# Patient Record
Sex: FEMALE | Race: OTHER | HISPANIC OR LATINO
[De-identification: names, ages, dates, MRNs, and addresses within clinical notes are randomized per-mention and may not be internally consistent; named-entity substitution may affect disease eponyms.]

---

## 2021-03-09 PROBLEM — Z00.00 ENCOUNTER FOR PREVENTIVE HEALTH EXAMINATION: Status: ACTIVE | Noted: 2021-03-09

## 2021-03-12 ENCOUNTER — APPOINTMENT (OUTPATIENT)
Dept: PODIATRY | Facility: CLINIC | Age: 56
End: 2021-03-12
Payer: COMMERCIAL

## 2021-03-12 VITALS — BODY MASS INDEX: 32.28 KG/M2 | WEIGHT: 171 LBS | HEIGHT: 61 IN

## 2021-03-12 DIAGNOSIS — E78.00 PURE HYPERCHOLESTEROLEMIA, UNSPECIFIED: ICD-10-CM

## 2021-03-12 DIAGNOSIS — I10 ESSENTIAL (PRIMARY) HYPERTENSION: ICD-10-CM

## 2021-03-12 DIAGNOSIS — Z78.9 OTHER SPECIFIED HEALTH STATUS: ICD-10-CM

## 2021-03-12 DIAGNOSIS — L60.2 ONYCHOGRYPHOSIS: ICD-10-CM

## 2021-03-12 DIAGNOSIS — Z83.3 FAMILY HISTORY OF DIABETES MELLITUS: ICD-10-CM

## 2021-03-12 DIAGNOSIS — L60.3 NAIL DYSTROPHY: ICD-10-CM

## 2021-03-12 PROCEDURE — 11721 DEBRIDE NAIL 6 OR MORE: CPT

## 2021-03-12 PROCEDURE — 99072 ADDL SUPL MATRL&STAF TM PHE: CPT

## 2021-03-12 PROCEDURE — 99203 OFFICE O/P NEW LOW 30 MIN: CPT | Mod: 25

## 2021-03-12 RX ORDER — ATORVASTATIN CALCIUM 10 MG/1
10 TABLET, FILM COATED ORAL
Refills: 0 | Status: ACTIVE | COMMUNITY

## 2021-03-12 RX ORDER — LISINOPRIL 5 MG/1
5 TABLET ORAL
Refills: 0 | Status: ACTIVE | COMMUNITY

## 2021-03-12 NOTE — PHYSICAL EXAM
[General Appearance - Alert] : alert [General Appearance - In No Acute Distress] : in no acute distress [No Joint Swelling] : no joint swelling [Pes Planus] : pes planus deformity [Normal Foot/Ankle] : Both lower extremities were exposed and visualized. Standing exam demonstrates normal foot posture and alignment. Hindfoot exam shows no hindfoot valgus or varus [Skin Color & Pigmentation] : normal skin color and pigmentation [Skin Turgor] : normal skin turgor [] : no rash [Skin Lesions] : no skin lesions [Sensation] : the sensory exam was normal to light touch and pinprick [No Focal Deficits] : no focal deficits [Deep Tendon Reflexes (DTR)] : deep tendon reflexes were 2+ and symmetric [Motor Exam] : the motor exam was normal [Oriented To Time, Place, And Person] : oriented to person, place, and time [Impaired Insight] : insight and judgment were intact [Affect] : the affect was normal [FreeTextEntry3] : Vascular exam reveals palpable pedal pulses, the foot is warm to touch, there was good capillary fill time, the skin is normal in appearance there is no evidence of vascular disease or compromise at this time [Foot Ulcer] : no foot ulcer [Skin Induration] : no skin induration [Diminished Throughout Right Foot] : normal sensation with monofilament testing throughout right foot [Diminished Throughout Left Foot] : normal sensation with monofilament testing throughout left foot

## 2021-03-12 NOTE — HISTORY OF PRESENT ILLNESS
[FreeTextEntry1] : Long-standing history of diabetes and hypoglycemics as well as insulin. Patient does admit to not being well-controlled and his recent hemoglobin A1c is 11.3 and he has been followed by Dr. davis and she tells me she has recently been referred to [facility]sl[facility] and endocrinology for diabetes and glycemic management

## 2021-03-12 NOTE — PROCEDURE
Ken Becker is aware of the expectations of the injection.  The risks and complications of the injection have been explained as well as the alternatives.  The complications were discussed. There were no assurances or guarantees given to Ken Becker as to the result of the injection.    The left knee was prepped with Chlorhexidine and alcohol, and the joint space was injected with 2 cc of Euflexxa.  The patient tolerated the injection well.       [FreeTextEntry1] : A lengthy discussion with the patient regarding diabetes and the manifestations second occur in the feet. The discussion included but was not limited to nail care, treatment of open wound, treatment of calluses, shoe gear, as well as statistics regarding diabetes as it relates to loss of toe, toes, midfoot, as well as mortality Re: diabetics wind up with a midfoot amputation, transmetatarsal amputation, as well as 50 percent of diabetics who suffer loss of the leg suffering mortality within 5 years. Educational literature was dispensed. Overall diabetic education as it regard to the foot was discussed with all questions asked and answered appropriately. I've advised the patient should there be any concerns regarding nails, infection, open wounds, interdigital problems, or acute injury that they should contact the office immediately\par I have had a lengthy discussion with the patient regarding overall skincare. The importance of the type of socks, the type of shoes, and the type of overall foot hygiene is important to help control and prevent eruptions especially over extreme weather changes. This included but was not limited to hydration and lubrication, dove soap, triple rinse clothing and linens. I also explained the importance of thorough drying of both feet especially the web spaces. Given the extreme temperatures back in a car I also reviewed the type of shoes that would help reduce the chances of cracking of the skin especially leading to fissuring of the heels. Overall skincare precautions were reviewed education literature dispensed in the patient's questions asked and answered appropriately.\par A complete and thorough evaluation of the type of shoes they should be wearing and type of shoes for this time of year was discussed with patient.\par Using sterile instrumentation debridement of all nails manually and electrically to decrease thickness, pain and girth and make shoe gear more comfortable with "slant back" procedure of any bordering spicules causing pain\par \par \par greater than 30 minutes spent with patient\par \par

## 2021-04-30 ENCOUNTER — APPOINTMENT (OUTPATIENT)
Dept: ENDOCRINOLOGY | Facility: CLINIC | Age: 56
End: 2021-04-30
Payer: COMMERCIAL

## 2021-04-30 ENCOUNTER — NON-APPOINTMENT (OUTPATIENT)
Age: 56
End: 2021-04-30

## 2021-04-30 VITALS
DIASTOLIC BLOOD PRESSURE: 70 MMHG | HEIGHT: 61 IN | OXYGEN SATURATION: 98 % | SYSTOLIC BLOOD PRESSURE: 120 MMHG | WEIGHT: 170 LBS | HEART RATE: 77 BPM | BODY MASS INDEX: 32.1 KG/M2

## 2021-04-30 PROCEDURE — 99203 OFFICE O/P NEW LOW 30 MIN: CPT

## 2021-04-30 PROCEDURE — 99072 ADDL SUPL MATRL&STAF TM PHE: CPT

## 2021-05-07 NOTE — HISTORY OF PRESENT ILLNESS
[FreeTextEntry1] : Apr 30, 2021       in person  with her daughter Miracle [phone]\par \par PCP:  Dr Hermes Barnes\par          Pod:   Dr. José Miguel Ramos\par           Eyes:   Dr. 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, works as house keeper.\par \par : :\par Constitutional:  Alert, well nourished, healthy appearance, no acute distress \par Eyes:  No proptosis, no stare\par Thyroid:\par Pulmonary:  No respiratory distress, no accessory muscle use; normal rate and effort\par Cardiac:  Normal rate\par Vascular: \par Endocrine:  No stigmata of Cushing’s Syndrome\par Musculoskeletal:  Normal gait, no involuntary movements\par Neurology:  No tremors\par Affect/Mood/Psych:  Oriented x 3; normal affect, normal insight/judgement, normal mood \par .\par \par \par .\par Does self monitoring, but did not bring meter today.\par On Lantus HS - 40\par metformin 1000 "prn"  \par \par Diet:\par \par Breakfast-coffee,  sweat potato, oatmeal, occas egg.\par Lunch-rice, meat\par Dinner - vegetables, milk, \par \par Impression:   Fingerstick BS data will be most helpful in providing guidance on diabetes management.\par She will bring records, fingerstick sugars, food diary, to next visit

## 2021-08-03 ENCOUNTER — APPOINTMENT (OUTPATIENT)
Dept: ENDOCRINOLOGY | Facility: CLINIC | Age: 56
End: 2021-08-03
Payer: COMMERCIAL

## 2021-08-03 VITALS
DIASTOLIC BLOOD PRESSURE: 60 MMHG | OXYGEN SATURATION: 99 % | HEART RATE: 82 BPM | SYSTOLIC BLOOD PRESSURE: 100 MMHG | WEIGHT: 175 LBS | BODY MASS INDEX: 33.04 KG/M2 | HEIGHT: 61 IN

## 2021-08-03 PROCEDURE — 99214 OFFICE O/P EST MOD 30 MIN: CPT

## 2021-08-03 NOTE — HISTORY OF PRESENT ILLNESS
[FreeTextEntry1] : Aug 03, 2021   in person with Miracle\par \par PCP:  Dr Hermes Barnes\par          Pod:   Dr. José Miguel Ramos\par           Eyes:   Dr. Sanchez  in [place] \par \par CC:   Type 1 diabetes (age 31)     3/8/21  A1c 11.3, glucose 212 mg/dl, C-peptide 1.8  \par \par She brings in the OneTouch Ultra 2 insulin  ([facility]  at [facility])\par Taking Lantus Solostar at 9 pm -  50 units.  \par \par Impression:  Metformin no longer appears to be providing benefit.\par She would benefit from addition of fast acting insulin (?Ademlog) AC meals by sliding scale)\par \par \par \par \par \par \par Apr 30, 2021       in person  with her daughter Miracle  [phone]\par \par PCP:  Dr Hermes Barnes\par          Pod:   Dr. José Miguel Ramos\par           Eyes:   Dr. 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, works as house keeper.\par \par : :\par Constitutional:  Alert, well nourished, healthy appearance, no acute distress \par Eyes:  No proptosis, no stare\par Thyroid:\par Pulmonary:  No respiratory distress, no accessory muscle use; normal rate and effort\par Cardiac:  Normal rate\par Vascular: \par Endocrine:  No stigmata of Cushing’s Syndrome\par Musculoskeletal:  Normal gait, no involuntary movements\par Neurology:  No tremors\par Affect/Mood/Psych:  Oriented x 3; normal affect, normal insight/judgement, normal mood \par .\par \par \par .\par Does self monitoring, but did not bring meter today.\par On Lantus HS - 40\par metformin 1000 "prn"  \par \par Diet:\par \par Breakfast-coffee,  sweat potato, oatmeal, occas egg.\par Lunch-rice, meat\par Dinner - vegetables, milk, \par \par Impression:   Fingerstick BS data will be most helpful in providing guidance on diabetes management.\par She will bring records, fingerstick sugars, food diary, to next visit

## 2021-08-08 RX ORDER — SYRING-NEEDL,DISP,INSUL,0.3 ML 31 GX5/16"
31G X 5/16" SYRINGE, EMPTY DISPOSABLE MISCELLANEOUS
Qty: 100 | Refills: 5 | Status: ACTIVE | COMMUNITY
Start: 2021-08-08 | End: 1900-01-01

## 2021-08-08 RX ORDER — INSULIN LISPRO 100 U/ML
100 INJECTION, SOLUTION SUBCUTANEOUS
Qty: 1 | Refills: 3 | Status: DISCONTINUED | COMMUNITY
Start: 2021-08-03 | End: 2021-08-08

## 2021-09-07 ENCOUNTER — APPOINTMENT (OUTPATIENT)
Dept: ENDOCRINOLOGY | Facility: CLINIC | Age: 56
End: 2021-09-07
Payer: COMMERCIAL

## 2021-09-07 VITALS
SYSTOLIC BLOOD PRESSURE: 120 MMHG | DIASTOLIC BLOOD PRESSURE: 60 MMHG | BODY MASS INDEX: 33.23 KG/M2 | HEART RATE: 86 BPM | HEIGHT: 61 IN | OXYGEN SATURATION: 98 % | WEIGHT: 176 LBS

## 2021-09-07 PROCEDURE — 99214 OFFICE O/P EST MOD 30 MIN: CPT

## 2021-09-07 RX ORDER — PEN NEEDLE, DIABETIC 29 G X1/2"
31G X 5 MM NEEDLE, DISPOSABLE MISCELLANEOUS
Qty: 120 | Refills: 6 | Status: ACTIVE | COMMUNITY
Start: 2021-08-08 | End: 1900-01-01

## 2021-09-07 RX ORDER — INSULIN ASPART 100 [IU]/ML
100 INJECTION, SOLUTION INTRAVENOUS; SUBCUTANEOUS
Qty: 1 | Refills: 1 | Status: DISCONTINUED | COMMUNITY
Start: 2021-08-08 | End: 2021-09-07

## 2021-09-09 PROBLEM — Z00.00 ENCOUNTER FOR PREVENTIVE HEALTH EXAMINATION: Status: ACTIVE | Noted: 2021-09-09

## 2021-09-10 RX ORDER — INSULIN GLARGINE 100 [IU]/ML
INJECTION, SOLUTION SUBCUTANEOUS
Refills: 0 | Status: DISCONTINUED | COMMUNITY
End: 2021-09-10

## 2021-09-10 NOTE — HISTORY OF PRESENT ILLNESS
[FreeTextEntry1] : Sep 07, 2021    in person     w/  Shruthi   c:   = other daughter   \par \par PCP:  Dr Hermes Barnes\par          Pod:   Dr. José Miguel Meehan \par           Eyes:   Dr. Sanchez  in [place] \par \par CC:   Type 1 diabetes (age 31)     3/8/21  A1c 11.3, glucose 212 mg/dl, C-peptide 1.8  \par \par She brings in the OneTouch Ultra 2  ([facility])\par Taking Lantus Solostar at 9 pm -  50 units.  \par \par She brings her OneTouch Ultra 2 meter -  I corrected year, date, time, language (to Occitan)\par Sugars relatively elevated.  \par \par : :\par Constitutional:  Alert, well nourished, healthy appearance, no acute distress \par Eyes:  No proptosis, no stare\par Thyroid:\par Pulmonary:  No respiratory distress, no accessory muscle use; normal rate and effort\par Cardiac:  Normal rate\par Vascular: \par Endocrine:  No stigmata of Cushing’s Syndrome\par Musculoskeletal:  Normal gait, no involuntary movements\par Neurology:  No tremors\par Affect/Mood/Psych:  Oriented x 3; normal affect, normal insight/judgement, normal mood \par .\par \par Impression:  \par Diabetes under loose control.\par She has available to her a relatively high carbohydrate diet.\par She would likely benefit from a short acting insulin before meals.\par \par Plan:  Start Insulin Lispro 4 units if BS is over 140.\par Contact me in about one week to review progress.\par \par \par \par \par \par \par Aug 03, 2021   in person with Miracle\par \par PCP:  Dr Hermes Barnes\par          Pod:   Dr. José Miguel Ramos\par           Eyes:   Dr. Sanchez  in [place] \par \par CC:   Type 1 diabetes (age 31)     3/8/21  A1c 11.3, glucose 212 mg/dl, C-peptide 1.8  \par \par She brings in the OneTouch Ultra 2 insulin  ([facility])\par Taking Lantus Solostar at 9 pm -  50 units.  \par \par Impression:  Metformin no longer appears to be providing benefit.\par She would benefit from addition of fast acting insulin (?Ademlog) AC meals by sliding scale)\par \par \par \par \par \par \par Apr 30, 2021       in person  with her daughter Miracle  [phone]\par \par PCP:  Dr Hermes Barnes\par          Pod:   Dr. José Miguel Ramos\par           Eyes:   Dr. 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, works as house keeper.\par \par : :\par Constitutional:  Alert, well nourished, healthy appearance, no acute distress \par Eyes:  No proptosis, no stare\par Thyroid:\par Pulmonary:  No respiratory distress, no accessory muscle use; normal rate and effort\par Cardiac:  Normal rate\par Vascular: \par Endocrine:  No stigmata of Cushing’s Syndrome\par Musculoskeletal:  Normal gait, no involuntary movements\par Neurology:  No tremors\par Affect/Mood/Psych:  Oriented x 3; normal affect, normal insight/judgement, normal mood \par .\par \par \par .\par Does self monitoring, but did not bring meter today.\par On Lantus HS - 40\par metformin 1000 "prn"  \par \par Diet:\par \par Breakfast-coffee,  sweat potato, oatmeal, occas egg.\par Lunch-rice, meat\par Dinner - vegetables, milk, \par \par Impression:   Fingerstick BS data will be most helpful in providing guidance on diabetes management.\par She will bring records, fingerstick sugars, food diary, to next visit

## 2021-09-12 ENCOUNTER — NON-APPOINTMENT (OUTPATIENT)
Age: 56
End: 2021-09-12

## 2021-09-12 RX ORDER — SYRING-NEEDL,DISP,INSUL,0.3 ML 30 GX5/16"
30G X 5/16" SYRINGE, EMPTY DISPOSABLE MISCELLANEOUS
Qty: 100 | Refills: 6 | Status: ACTIVE | COMMUNITY
Start: 2021-09-12 | End: 1900-01-01

## 2021-09-12 RX ORDER — INSULIN ASPART 100 [IU]/ML
100 INJECTION, SOLUTION INTRAVENOUS; SUBCUTANEOUS
Qty: 1 | Refills: 1 | Status: ACTIVE | COMMUNITY
Start: 2021-09-12 | End: 1900-01-01

## 2021-09-14 ENCOUNTER — APPOINTMENT (OUTPATIENT)
Dept: OBGYN | Facility: CLINIC | Age: 56
End: 2021-09-14

## 2021-10-05 ENCOUNTER — APPOINTMENT (OUTPATIENT)
Dept: ENDOCRINOLOGY | Facility: CLINIC | Age: 56
End: 2021-10-05
Payer: COMMERCIAL

## 2021-10-05 VITALS
BODY MASS INDEX: 33.04 KG/M2 | SYSTOLIC BLOOD PRESSURE: 125 MMHG | OXYGEN SATURATION: 98 % | HEIGHT: 61 IN | DIASTOLIC BLOOD PRESSURE: 78 MMHG | HEART RATE: 75 BPM | WEIGHT: 175 LBS

## 2021-10-05 PROCEDURE — 99214 OFFICE O/P EST MOD 30 MIN: CPT

## 2021-10-05 RX ORDER — METFORMIN HYDROCHLORIDE 1000 MG/1
1000 TABLET, COATED ORAL
Qty: 180 | Refills: 3 | Status: ACTIVE | COMMUNITY

## 2021-10-05 RX ORDER — INSULIN LISPRO 100 [IU]/ML
(75-25) 100 INJECTION, SUSPENSION SUBCUTANEOUS
Qty: 2 | Refills: 5 | Status: ACTIVE | COMMUNITY
Start: 2021-10-05 | End: 1900-01-01

## 2021-10-06 RX ORDER — INSULIN LISPRO 100 U/ML
100 INJECTION, SOLUTION SUBCUTANEOUS
Qty: 2 | Refills: 0 | Status: ACTIVE | COMMUNITY
Start: 2021-09-07 | End: 1900-01-01

## 2021-10-06 NOTE — HISTORY OF PRESENT ILLNESS
[FreeTextEntry1] : Oct 05, 2021    in person    w/ Shruthi  \par \par PCP:  Dr Hermes Barnes\par          Pod:   Dr. José Miguel Meehan \par           Eyes:   Dr. Sanchez  in [place] \par \par CC:   Type 1 diabetes (age 31)     3/8/21  A1c 11.3, glucose 212 mg/dl, C-peptide 1.8  \par \par \par Brings in her blood glucose meter, data reviewed, testing sugars 4X a day \par Remains on Lantus insulin 50 units once a day in PM and Novollog by sliding scale ac meals.\par Turns out that Novolog pen is not preferred insulin.\par \par Comes in with replacement Juliano 2 and Shruthi instructed in its use.  \par Will switch from Novolog before lunch to Admelog Solostar before lunch by sliding scale:  BW under 140: 0;  BS over 140: 4 units;\par BS over 200: 5 units.  \par \par \par Sep 07, 2021    in person     w/  Shruthi   c:   = other daughter   \par \par PCP:  Dr Hermes Barnes\par          Pod:   Dr. José Miguel Meehan \par           Eyes:   Dr. Sanchez  in [place] \par \par CC:   Type 1 diabetes (age 31)     3/8/21  A1c 11.3, glucose 212 mg/dl, C-peptide 1.8  \par \par She brings in the OneTouch Ultra 2  ([facility]  at [facility])\par Taking Lantus Solostar at 9 pm -  50 units.  \par \par She brings her OneTouch Ultra 2 meter -  I corrected year, date, time, language (to [place] citizen)\par Sugars relatively elevated.  \par \par : :\par Constitutional:  Alert, well nourished, healthy appearance, no acute distress \par Eyes:  No proptosis, no stare\par Thyroid:\par Pulmonary:  No respiratory distress, no accessory muscle use; normal rate and effort\par Cardiac:  Normal rate\par Vascular: \par Endocrine:  No stigmata of Cushing’s Syndrome\par Musculoskeletal:  Normal gait, no involuntary movements\par Neurology:  No tremors\par Affect/Mood/Psych:  Oriented x 3; normal affect, normal insight/judgement, normal mood \par .\par \par Impression:  \par Diabetes under loose control.\par She has available to her a relatively high carbohydrate diet.\par She would likely benefit from a short acting insulin before meals.\par \par Plan:  Start Insulin Lispro 4 units if BS is over 140.\par Contact me in about one week to review progress.\par \par \par \par \par \par \par Aug 03, 2021   in person with Miracle\par \par PCP:  Dr Hermes Barnes\par          Pod:   Dr. José Miguel Ramos\par           Eyes:   Dr. Sanchez  in [place] \par \par CC:   Type 1 diabetes (age 31)     3/8/21  A1c 11.3, glucose 212 mg/dl, C-peptide 1.8  \par \par She brings in the OneTouch Ultra 2 insulin  ([facility]  at [facility])\par Taking Lantus Solostar at 9 pm -  50 units.  \par \par Impression:  Metformin no longer appears to be providing benefit.\par She would benefit from addition of fast acting insulin (?Ademlog) AC meals by sliding scale)\par \par \par \par \par \par \par Apr 30, 2021       in person  with her daughter Miracle  [phone]\par \par PCP:  Dr Hermes Barnes\par          Pod:   Dr. José Miguel Ramos\par           Eyes:   Dr. 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, works as house keeper.\par \par : :\par Constitutional:  Alert, well nourished, healthy appearance, no acute distress \par Eyes:  No proptosis, no stare\par Thyroid:\par Pulmonary:  No respiratory distress, no accessory muscle use; normal rate and effort\par Cardiac:  Normal rate\par Vascular: \par Endocrine:  No stigmata of Cushing’s Syndrome\par Musculoskeletal:  Normal gait, no involuntary movements\par Neurology:  No tremors\par Affect/Mood/Psych:  Oriented x 3; normal affect, normal insight/judgement, normal mood \par .\par \par \par .\par Does self monitoring, but did not bring meter today.\par On Lantus HS - 40\par metformin 1000 "prn"  \par \par Diet:\par \par Breakfast-coffee,  sweat potato, oatmeal, occas egg.\par Lunch-rice, meat\par Dinner - vegetables, milk, \par \par Impression:   Fingerstick BS data will be most helpful in providing guidance on diabetes management.\par She will bring records, fingerstick sugars, food diary, to next visit

## 2021-11-02 ENCOUNTER — APPOINTMENT (OUTPATIENT)
Dept: ENDOCRINOLOGY | Facility: CLINIC | Age: 56
End: 2021-11-02
Payer: COMMERCIAL

## 2021-11-02 VITALS
OXYGEN SATURATION: 97 % | DIASTOLIC BLOOD PRESSURE: 70 MMHG | WEIGHT: 175 LBS | SYSTOLIC BLOOD PRESSURE: 125 MMHG | HEIGHT: 61 IN | HEART RATE: 81 BPM | BODY MASS INDEX: 33.04 KG/M2

## 2021-11-02 PROCEDURE — 99214 OFFICE O/P EST MOD 30 MIN: CPT

## 2021-11-02 RX ORDER — INSULIN GLARGINE 100 [IU]/ML
100 INJECTION, SOLUTION SUBCUTANEOUS
Qty: 21 | Refills: 3 | Status: ACTIVE | COMMUNITY
Start: 2021-10-05 | End: 1900-01-01

## 2021-11-02 NOTE — HISTORY OF PRESENT ILLNESS
[FreeTextEntry1] : Nov 02, 2021    in person   w/ Shruthi \par \par PCP:  Dr Hermes Barnes\par          Pod:   Dr. José Miguel Meehan \par           Eyes:   Dr. Sanchez  in [place] \par \par CC:   Type 1 diabetes (age 31)     3/8/21  A1c 11.3, glucose 212 mg/dl, C-peptide 1.8  \par \par Had lab tests a few days ago by Dr. Barnes including A1c of 11.1 % \par She is now using the Freestyle Juliano 2 with the Juliano 2 reader which nicely documents the \par elevated blood sugars.\par She finds the Juliano 2 very helpful and she checks her sugars at least 4X a day.\par \par She is taking Lantus insulin, usually 45-50 units in the PM as well as the rapid acting insulin 3X a day by sliding scale\par before meals.\par \par : :\par Constitutional:  Alert, well nourished, healthy appearance, no acute distress \par Eyes:  No proptosis, no stare\par Thyroid:\par Pulmonary:  No respiratory distress, no accessory muscle use; normal rate and effort\par Cardiac:  Normal rate\par Vascular: \par Endocrine:  No stigmata of Cushing’s Syndrome\par Musculoskeletal:  Normal gait, no involuntary movements\par Neurology:  No tremors\par Affect/Mood/Psych:  Oriented x 3; normal affect, normal insight/judgement, normal mood \par .\par \par Impression:  Juliano 2 data and A1c confirm rooom for improvement in blood sugars.\par Increase in Lantus may be helpful.  \par \par \par \par \par Plan:  Juliano 2 jory added to her phone, just in case.\par Will put in another request for the rapid acting insulin.\par If she wakes up with FBS over 200, she will add an additional 20 units of Lantus.\par ROV in March.  \par \par \par \par Oct 05, 2021    in person    w/ Shruthi  \par \par PCP:  Dr Hermes Barnes\par          Pod:   Dr. José Miguel Meehan \par           Eyes:   Dr. Sanchez  in [place] \par \par CC:   Type 1 diabetes (age 31)     3/8/21  A1c 11.3, glucose 212 mg/dl, C-peptide 1.8  \par \par \par Brings in her blood glucose meter, data reviewed, testing sugars 4X a day \par Remains on Lantus insulin 50 units once a day in PM and Novollog by sliding scale ac meals.\par Turns out that Novolog pen is not preferred insulin.\par \par Comes in with replacement Juliano 2 and Shruthi instructed in its use.  \par Will switch from Novolog before lunch to Admelog Solostar before lunch by sliding scale:  BW under 140: 0;  BS over 140: 4 units;\par BS over 200: 5 units.  \par \par \par Sep 07, 2021    in person     w/  Shruthi   c:   = other daughter   \par \par PCP:  Dr Hermes Barnes\par          Pod:   Dr. José Miguel Meehan \par           Eyes:   Dr. Sanchez  in [place] \par \par CC:   Type 1 diabetes (age 31)     3/8/21  A1c 11.3, glucose 212 mg/dl, C-peptide 1.8  \par \par She brings in the OneTouch Ultra 2  ([facility]  at [facility])\par Taking Lantus Solostar at 9 pm -  50 units.  \par \par She brings her OneTouch Ultra 2 meter -  I corrected year, date, time, language (to [place])\par Sugars relatively elevated.  \par \par : :\par Constitutional:  Alert, well nourished, healthy appearance, no acute distress \par Eyes:  No proptosis, no stare\par Thyroid:\par Pulmonary:  No respiratory distress, no accessory muscle use; normal rate and effort\par Cardiac:  Normal rate\par Vascular: \par Endocrine:  No stigmata of Cushing’s Syndrome\par Musculoskeletal:  Normal gait, no involuntary movements\par Neurology:  No tremors\par Affect/Mood/Psych:  Oriented x 3; normal affect, normal insight/judgement, normal mood \par .\par \par Impression:  \par Diabetes under loose control.\par She has available to her a relatively high carbohydrate diet.\par She would likely benefit from a short acting insulin before meals.\par \par Plan:  Start Insulin Lispro 4 units if BS is over 140.\par Contact me in about one week to review progress.\par \par \par \par \par \par \par Aug 03, 2021   in person with Miracle\par \par PCP:  Dr Hermes Barnes\par          Pod:   Dr. José Miguel Ramos\par           Eyes:   Dr. Sanchez  in [place] \par \par CC:   Type 1 diabetes (age 31)     3/8/21  A1c 11.3, glucose 212 mg/dl, C-peptide 1.8  \par \par She brings in the OneTouch Ultra 2 insulin  ([facility])\par Taking Lantus Solostar at 9 pm -  50 units.  \par \par Impression:  Metformin no longer appears to be providing benefit.\par She would benefit from addition of fast acting insulin (?Ademlog) AC meals by sliding scale)\par \par \par \par \par \par \par Apr 30, 2021       in person  with her daughter Miracle  [phone]\par \par PCP:  Dr Hermes Barnes\par          Pod:   Dr. José Miguel Ramos\par           Eyes:   Dr. 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, works as house keeper.\par \par : :\par Constitutional:  Alert, well nourished, healthy appearance, no acute distress \par Eyes:  No proptosis, no stare\par Thyroid:\par Pulmonary:  No respiratory distress, no accessory muscle use; normal rate and effort\par Cardiac:  Normal rate\par Vascular: \par Endocrine:  No stigmata of Cushing’s Syndrome\par Musculoskeletal:  Normal gait, no involuntary movements\par Neurology:  No tremors\par Affect/Mood/Psych:  Oriented x 3; normal affect, normal insight/judgement, normal mood \par .\par \par \par .\par Does self monitoring, but did not bring meter today.\par On Lantus HS - 40\par metformin 1000 "prn"  \par \par Diet:\par \par Breakfast-coffee,  sweat potato, oatmeal, occas egg.\par Lunch-rice, meat\par Dinner - vegetables, milk, \par \par Impression:   Fingerstick BS data will be most helpful in providing guidance on diabetes management.\par She will bring records, fingerstick sugars, food diary, to next visit

## 2021-11-06 RX ORDER — INSULIN ASPART 100 [IU]/ML
100 INJECTION, SOLUTION INTRAVENOUS; SUBCUTANEOUS
Qty: 6 | Refills: 6 | Status: ACTIVE | COMMUNITY
Start: 2021-11-06 | End: 1900-01-01

## 2022-01-04 ENCOUNTER — APPOINTMENT (OUTPATIENT)
Dept: ENDOCRINOLOGY | Facility: CLINIC | Age: 57
End: 2022-01-04

## 2022-01-05 ENCOUNTER — APPOINTMENT (OUTPATIENT)
Dept: ENDOCRINOLOGY | Facility: CLINIC | Age: 57
End: 2022-01-05
Payer: COMMERCIAL

## 2022-01-05 VITALS
OXYGEN SATURATION: 98 % | WEIGHT: 175 LBS | SYSTOLIC BLOOD PRESSURE: 125 MMHG | DIASTOLIC BLOOD PRESSURE: 78 MMHG | HEART RATE: 82 BPM | HEIGHT: 61 IN | BODY MASS INDEX: 33.04 KG/M2

## 2022-01-05 DIAGNOSIS — E11.9 TYPE 2 DIABETES MELLITUS W/OUT COMPLICATIONS: ICD-10-CM

## 2022-01-05 PROCEDURE — 99214 OFFICE O/P EST MOD 30 MIN: CPT

## 2022-01-05 NOTE — HISTORY OF PRESENT ILLNESS
[FreeTextEntry1] : Jan 05, 2022     in person  rtichie Hawkins\par \par PCP:  Dr Hermes Barnes\par          Pod:   Dr. José Miguel Meehan \par           Eyes:   Dr. Sanchez  in [place] \par \par CC:   Type 1 diabetes (age 31)     3/8/21  A1c 11.3, glucose 212 mg/dl, C-peptide 1.8  \par \par Saw Dr. Barnes last month.  \par Contineues to find the Juliano 2 [place] and sensors very helpful.\par She verifies her data with the One Touch Ultra 2 4X a day \par \par  Lantus insulin, usually 45-50 units in the PM as well as the rapid acting insulin 3X a day by sliding scale\par before meals.\par \par PlaN:  Increase the Lantus to 50 in PM and 10 in AM\par and Novolog  BID 15 by sliding scale  \par Continue to strive to keep to a lower carbohydrate diet.\par Bring Juliano with even more data next visit.  \par \par \par Nov 02, 2021    in person   w/ Shruthi \par \par PCP:  Dr Hermes Barnes\par          Pod:   Dr. José Miguel Meehan \par           Eyes:   Dr. Sanchez  in [place] \par \par CC:   Type 1 diabetes (age 31)     3/8/21  A1c 11.3, glucose 212 mg/dl, C-peptide 1.8  \par \par Had lab tests a few days ago by Dr. Barnes including A1c of 11.1 % \par She is now using the Freestyle Juliano 2 with the Juliano 2 reader which nicely documents the \par elevated blood sugars.\par She finds the Juliano 2 very helpful and she checks her sugars at least 4X a day.\par \par She is taking Lantus insulin, usually 45-50 units in the PM as well as the rapid acting insulin 3X a day by sliding scale\par before meals.\par \par : :\par Constitutional:  Alert, well nourished, healthy appearance, no acute distress \par Eyes:  No proptosis, no stare\par Thyroid:\par Pulmonary:  No respiratory distress, no accessory muscle use; normal rate and effort\par Cardiac:  Normal rate\par Vascular: \par Endocrine:  No stigmata of Cushing’s Syndrome\par Musculoskeletal:  Normal gait, no involuntary movements\par Neurology:  No tremors\par Affect/Mood/Psych:  Oriented x 3; normal affect, normal insight/judgement, normal mood \par .\par \par Impression:  Juliano 2 data and A1c confirm rooom for improvement in blood sugars.\par Increase in Lantus may be helpful.  \par \par \par \par \par Plan:  Juliano 2 jory added to her phone, just in case.\par Will put in another request for the rapid acting insulin.\par If she wakes up with FBS over 200, she will add an additional 20 units of Lantus.\par ROV in March.  \par \par \par \par Oct 05, 2021    in person    w/ Shruthi  \par \par PCP:  Dr Hermes Barnes\par          Pod:   Dr. José Miguel Meehan \par           Eyes:   Dr. Sanchez  in [place] \par \par CC:   Type 1 diabetes (age 31)     3/8/21  A1c 11.3, glucose 212 mg/dl, C-peptide 1.8  \par \par \par Brings in her blood glucose meter, data reviewed, testing sugars 4X a day \par Remains on Lantus insulin 50 units once a day in PM and Novollog by sliding scale ac meals.\par Turns out that Novolog pen is not preferred insulin.\par \par Comes in with replacement Juliano 2 and Shruthi instructed in its use.  \par Will switch from Novolog before lunch to Admelog Solostar before lunch by sliding scale:  BW under 140: 0;  BS over 140: 4 units;\par BS over 200: 5 units.  \par \par \par Sep 07, 2021    in person     w/  Shruthi   c:   = other daughter   \par \par PCP:  Dr Hermes Barnes\par          Pod:   Dr. José Miguel Meehan \par           Eyes:   Dr. Sanchez  in [place] \par \par CC:   Type 1 diabetes (age 31)     3/8/21  A1c 11.3, glucose 212 mg/dl, C-peptide 1.8  \par \par She brings in the OneTouch Ultra 2  ([facility]  at [facility])\par Taking Lantus Solostar at 9 pm -  50 units.  \par \par She brings her OneTouch Ultra 2 meter -  I corrected year, date, time, language (to [place])\par Sugars relatively elevated.  \par \par : :\par Constitutional:  Alert, well nourished, healthy appearance, no acute distress \par Eyes:  No proptosis, no stare\par Thyroid:\par Pulmonary:  No respiratory distress, no accessory muscle use; normal rate and effort\par Cardiac:  Normal rate\par Vascular: \par Endocrine:  No stigmata of Cushing’s Syndrome\par Musculoskeletal:  Normal gait, no involuntary movements\par Neurology:  No tremors\par Affect/Mood/Psych:  Oriented x 3; normal affect, normal insight/judgement, normal mood \par .\par \par Impression:  \par Diabetes under loose control.\par She has available to her a relatively high carbohydrate diet.\par She would likely benefit from a short acting insulin before meals.\par \par Plan:  Start Insulin Lispro 4 units if BS is over 140.\par Contact me in about one week to review progress.\par \par \par \par \par \par \par Aug 03, 2021   in person with Miracle\par \par PCP:  Dr Hermes Barnes\par          Pod:   Dr. José Miguel Ramos\par           Eyes:   Dr. Sanchez  in [place] \par \par CC:   Type 1 diabetes (age 31)     3/8/21  A1c 11.3, glucose 212 mg/dl, C-peptide 1.8  \par \par She brings in the OneTouch Ultra 2 insulin  ([facility]  at [facility])\par Taking Lantus Solostar at 9 pm -  50 units.  \par \par Impression:  Metformin no longer appears to be providing benefit.\par She would benefit from addition of fast acting insulin (?Ademlog) AC meals by sliding scale)\par \par \par \par \par \par \par Apr 30, 2021       in person  with her daughter Miracle  [phone]\par \par PCP:  Dr Hermes Barnes\par          Pod:   Dr. José Miguel Ramos\par           Eyes:   Dr. Sanchez  in [place] \par \par CC:   Diabetes (age 31)          recent A1c 11.3\par \par 54 yo, originally from [place], mother of 3, works as house keeper.\par \par : :\par Constitutional:  Alert, well nourished, healthy appearance, no acute distress \par Eyes:  No proptosis, no stare\par Thyroid:\par Pulmonary:  No respiratory distress, no accessory muscle use; normal rate and effort\par Cardiac:  Normal rate\par Vascular: \par Endocrine:  No stigmata of Cushing’s Syndrome\par Musculoskeletal:  Normal gait, no involuntary movements\par Neurology:  No tremors\par Affect/Mood/Psych:  Oriented x 3; normal affect, normal insight/judgement, normal mood \par .\par \par \par .\par Does self monitoring, but did not bring meter today.\par On Lantus HS - 40\par metformin 1000 "prn"  \par \par Diet:\par \par Breakfast-coffee,  sweat potato, oatmeal, occas egg.\par Lunch-rice, meat\par Dinner - vegetables, milk, \par \par Impression:   Fingerstick BS data will be most helpful in providing guidance on diabetes management.\par She will bring records, fingerstick sugars, food diary, to next visit

## 2022-05-10 ENCOUNTER — APPOINTMENT (OUTPATIENT)
Dept: ENDOCRINOLOGY | Facility: CLINIC | Age: 57
End: 2022-05-10

## 2023-06-29 ENCOUNTER — TRANSCRIPTION ENCOUNTER (OUTPATIENT)
Age: 58
End: 2023-06-29